# Patient Record
Sex: MALE | Race: WHITE | Employment: OTHER | URBAN - METROPOLITAN AREA
[De-identification: names, ages, dates, MRNs, and addresses within clinical notes are randomized per-mention and may not be internally consistent; named-entity substitution may affect disease eponyms.]

---

## 2019-07-10 ENCOUNTER — HOSPITAL ENCOUNTER (EMERGENCY)
Age: 74
Discharge: HOME | End: 2019-07-10
Attending: EMERGENCY MEDICINE
Payer: MEDICARE

## 2019-07-10 ENCOUNTER — APPOINTMENT (OUTPATIENT)
Dept: ULTRASOUND IMAGING | Age: 74
End: 2019-07-10
Attending: EMERGENCY MEDICINE
Payer: MEDICARE

## 2019-07-10 VITALS
RESPIRATION RATE: 16 BRPM | HEART RATE: 50 BPM | BODY MASS INDEX: 26.87 KG/M2 | HEIGHT: 69 IN | WEIGHT: 181.44 LBS | TEMPERATURE: 98.1 F | OXYGEN SATURATION: 99 % | DIASTOLIC BLOOD PRESSURE: 76 MMHG | SYSTOLIC BLOOD PRESSURE: 135 MMHG

## 2019-07-10 DIAGNOSIS — M79.89 LEG SWELLING: Primary | ICD-10-CM

## 2019-07-10 PROCEDURE — 93971 EXTREMITY STUDY: CPT

## 2019-07-10 PROCEDURE — 99282 EMERGENCY DEPT VISIT SF MDM: CPT

## 2019-07-10 RX ORDER — LANOLIN ALCOHOL/MO/W.PET/CERES
400 CREAM (GRAM) TOPICAL DAILY
COMMUNITY

## 2019-07-10 NOTE — ED TRIAGE NOTES
TRIAGE NOTE: patient arrived from home with c/o LEFT foot pain for the past 5 days. Sent here for doppler.  [facility] did blood work, XR and EKG

## 2019-07-10 NOTE — ED PROVIDER NOTES
HPI   The patient is a 26-year-old white male with a history of arthritis hiatal hernia hypertension kidney stone left inguinal hernia and prostate CA. Today he presents with leg pain and swelling which he awoke with 3 days prior to admission mostly in the ankle. He also has some ecchymosis under his foot and his ankle. He does not remember any significant trauma. [facility] not show any acute findings. They sent him here for a duplex to rule out a blood clot. He denies any chest pain or shortness of breath or any other systemic symptoms. Past Medical History:   Diagnosis Date    Arthritis     GERD (gastroesophageal reflux disease)     usually controlled with med    Hiatal hernia     Hypertension     Kidney stone     SINGLE EPISODE 2003    Left inguinal hernia 7/11/2012    Prostate CA ([address] 2003       Past Surgical History:   Procedure Laterality Date    ENDOSCOPY, COLON, DIAGNOSTIC  ?03    HX GI  07    EGD    HX HEENT  2012    catarac removal-stu.  HX OTHER SURGICAL      12/2012: Left Inguinal Hernia repair    HX RADICAL PROSTATECTOMY  03    HX UROLOGICAL  2003    LITHOTRIPSY    TOTAL HIP ARTHROPLASTY  06    right         Family History:   Problem Relation Age of Onset    Heart Disease Mother     Hypertension Father     MS Sister     MS Other        Social History     Socioeconomic History    Marital status:      Spouse name: Not on file    Number of children: Not on file    Years of education: Not on file    Highest education level: Not on file   Occupational History    Not on file   Social Needs    Financial resource strain: Not on file    Food insecurity:     Worry: Not on file     Inability: Not on file    Transportation needs:     Medical: Not on file     Non-medical: Not on file   Tobacco Use    Smoking status: Never Smoker    Smokeless tobacco: Never Used   Substance and Sexual Activity    Alcohol use:  Yes Alcohol/week: 1.0 oz     Types: 2 drink(s) per week    Drug use: No    Sexual activity: Not on file   Lifestyle    Physical activity:     Days per week: Not on file     Minutes per session: Not on file    Stress: Not on file   Relationships    Social connections:     Talks on phone: Not on file     Gets together: Not on file     Attends Scientologist service: Not on file     Active member of club or organization: Not on file     Attends meetings of clubs or organizations: Not on file     Relationship status: Not on file    Intimate partner violence:     Fear of current or ex partner: Not on file     Emotionally abused: Not on file     Physically abused: Not on file     Forced sexual activity: Not on file   Other Topics Concern    Not on file   Social History Narrative    Not on file         ALLERGIES: Patient has no known allergies. Review of Systems   All other systems reviewed and are negative. Vitals:    07/10/19 1417   BP: 147/74   Pulse: (!) 52   Resp: 18   Temp: 97.5 °F (36.4 °C)   SpO2: 97%   Weight: 82.3 kg (181 lb 7 oz)   Height: 5' 9\" (1.753 m)            Physical Exam   Constitutional: He is oriented to person, place, and time. He appears well-developed and well-nourished. HENT:   Head: Normocephalic and atraumatic. Mouth/Throat: Oropharynx is clear and moist. No oropharyngeal exudate. Eyes: Conjunctivae are normal. No scleral icterus. Neck: Neck supple. No thyromegaly present. Cardiovascular: Normal rate, regular rhythm and normal heart sounds. Exam reveals no gallop and no friction rub. No murmur heard. Pulmonary/Chest: Effort normal and breath sounds normal. No stridor. No respiratory distress. He has no wheezes. He has no rales. Abdominal: Soft. Bowel sounds are normal. There is no tenderness. There is no rebound and no guarding. Musculoskeletal: Normal range of motion.    There is mild swelling of the left ankle it is not hot or red there is some ecchymosis in the sole of his foot in the back of the ankle   Lymphadenopathy:     He has no cervical adenopathy. Neurological: He is alert and oriented to person, place, and time. Skin: Skin is warm and dry. Psychiatric: He has a normal mood and affect. Nursing note and vitals reviewed.        MDM  Number of Diagnoses or Management Options     Amount and/or Complexity of Data Reviewed  Clinical lab tests: reviewed and ordered  Tests in the radiology section of CPT®: ordered and reviewed  Tests in the medicine section of CPT®: ordered and reviewed    Risk of Complications, Morbidity, and/or Mortality  Presenting problems: low  Diagnostic procedures: low  Management options: low           Procedures    Assessment and plan     the patient had a duplex which revealed no DVT he has some mild swelling of the left lower extremity of uncertain etiology will discharge home with close follow-up with his PCP

## 2022-02-11 ENCOUNTER — OFFICE VISIT (OUTPATIENT)
Dept: NEUROLOGY | Age: 77
End: 2022-02-11
Payer: MEDICARE

## 2022-02-11 VITALS
OXYGEN SATURATION: 99 % | WEIGHT: 175 LBS | HEIGHT: 69 IN | BODY MASS INDEX: 25.92 KG/M2 | DIASTOLIC BLOOD PRESSURE: 70 MMHG | HEART RATE: 53 BPM | SYSTOLIC BLOOD PRESSURE: 130 MMHG

## 2022-02-11 DIAGNOSIS — G23.1 PSP (PROGRESSIVE SUPRANUCLEAR PALSY) (HCC): Primary | ICD-10-CM

## 2022-02-11 DIAGNOSIS — R29.6 FALLS FREQUENTLY: ICD-10-CM

## 2022-02-11 DIAGNOSIS — M48.062 LUMBAR STENOSIS WITH NEUROGENIC CLAUDICATION: ICD-10-CM

## 2022-02-11 PROCEDURE — G8432 DEP SCR NOT DOC, RNG: HCPCS | Performed by: PSYCHIATRY & NEUROLOGY

## 2022-02-11 PROCEDURE — G8754 DIAS BP LESS 90: HCPCS | Performed by: PSYCHIATRY & NEUROLOGY

## 2022-02-11 PROCEDURE — G8536 NO DOC ELDER MAL SCRN: HCPCS | Performed by: PSYCHIATRY & NEUROLOGY

## 2022-02-11 PROCEDURE — G8427 DOCREV CUR MEDS BY ELIG CLIN: HCPCS | Performed by: PSYCHIATRY & NEUROLOGY

## 2022-02-11 PROCEDURE — G8752 SYS BP LESS 140: HCPCS | Performed by: PSYCHIATRY & NEUROLOGY

## 2022-02-11 PROCEDURE — 1101F PT FALLS ASSESS-DOCD LE1/YR: CPT | Performed by: PSYCHIATRY & NEUROLOGY

## 2022-02-11 PROCEDURE — 99204 OFFICE O/P NEW MOD 45 MIN: CPT | Performed by: PSYCHIATRY & NEUROLOGY

## 2022-02-11 PROCEDURE — G8419 CALC BMI OUT NRM PARAM NOF/U: HCPCS | Performed by: PSYCHIATRY & NEUROLOGY

## 2022-02-11 RX ORDER — CARBIDOPA AND LEVODOPA 25; 100 MG/1; MG/1
TABLET ORAL
COMMUNITY
Start: 2022-02-07

## 2022-02-11 NOTE — PROGRESS NOTES
Chief Complaint   Patient presents with    Neurologic Problem     falls       Referred by: Dr. Perez Edgar is a 58-year-old gentleman with hypertension, lumbosacral degenerative disc disease here with his wife to discuss an additional opinion regarding his condition. It sounds like for the past couple years has had a lot of falls worse in the last year. He tends to fall backwards. He is also had voice changes. He is slower. He actually did see Dr. Dwight Shultz in neurology and was given a diagnosis of PSP. He is also seen various orthopedic specialist for lumbosacral spine disease. On further questioning he does admit to drooling. No nightmares. No hallucinations. An MRI was completed suggesting confirmation of PSP done at outside facility. Concomitant to this he is having a lot of radicular back pain into the right groin. He has had the right knee and right hip treated by orthopedics. He is currently in PT and speech with improvement. He is on carbidopa. Review of Systems   Musculoskeletal: Positive for back pain and falls. Psychiatric/Behavioral: Negative for hallucinations. All other systems reviewed and are negative.       Past Medical History:   Diagnosis Date    Arthritis     GERD (gastroesophageal reflux disease)     usually controlled with med    Hiatal hernia     Hypertension     Kidney stone     SINGLE EPISODE 2003    Left inguinal hernia 7/11/2012    Prostate CA ([facility] [address] 2003     Family History   Problem Relation Age of Onset    Heart Disease Mother     Hypertension Father     Mult Sclerosis Sister     Mult Sclerosis Other      Social History     Socioeconomic History    Marital status:      Spouse name: Not on file    Number of children: Not on file    Years of education: Not on file    Highest education level: Not on file   Occupational History    Not on file   Tobacco Use    Smoking status: Never Smoker    Smokeless tobacco: Never Used   Substance and Sexual Activity    Alcohol use: Yes     Alcohol/week: 1.7 standard drinks     Types: 2 drink(s) per week    Drug use: No    Sexual activity: Not on file   Other Topics Concern    Not on file   Social History Narrative    Not on file     Social Determinants of Health     Financial Resource Strain:     Difficulty of Paying Living Expenses: Not on file   Food Insecurity:     Worried About Running Out of Food in the Last Year: Not on file    Kellen of Food in the Last Year: Not on file   Transportation Needs:     Lack of Transportation (Medical): Not on file    Lack of Transportation (Non-Medical): Not on file   Physical Activity:     Days of Exercise per Week: Not on file    Minutes of Exercise per Session: Not on file   Stress:     Feeling of Stress : Not on file   Social Connections:     Frequency of Communication with Friends and Family: Not on file    Frequency of Social Gatherings with Friends and Family: Not on file    Attends Amish Services: Not on file    Active Member of [address] [place] [address] or Organizations: Not on file    Attends Club or Organization Meetings: Not on file    Marital Status: Not on file   Intimate Partner Violence:     Fear of Current or Ex-Partner: Not on file    Emotionally Abused: Not on file    Physically Abused: Not on file    Sexually Abused: Not on file   Housing Stability:     Unable to Pay for Housing in the Last Year: Not on file    Number of [place] in the Last Year: Not on file    Unstable Housing in the Last Year: Not on file     Current Outpatient Medications   Medication Sig    carbidopa-levodopa (SINEMET)  mg per tablet     omeprazole (PRILOSEC) 20 mg capsule Take 20 mg by mouth daily.  hydrochlorothiazide (HYDRODIURIL) 25 mg tablet Take 1 Tab by mouth daily.  amlodipine (NORVASC) 5 mg tablet Take 1 Tab by mouth daily.  benazepril (LOTENSIN) 40 mg tablet Take 1 Tab by mouth daily.     magnesium oxide (MAG-OX) 400 mg tablet Take 400 mg by mouth daily. (Patient not taking: Reported on 2/11/2022)    aspirin delayed-release 81 mg tablet Take  by mouth daily. (Patient not taking: Reported on 2/11/2022)     No current facility-administered medications for this visit. No Known Allergies      Neurologic Exam     Mental Status   Age-appropriate gentleman awake and alert. Pleasant. Very stare-like appearance with reduced blink rate  His face is masklike consistent with parkinsonism  Speech is soft hoarse and slow  He is rather bradykinetic throughout arms and legs  No resting tremor  Gait with some hesitation slightly wide but good tempo not shuffling once in motion     Physical Exam  Vitals and nursing note reviewed. Constitutional:       Appearance: Normal appearance. Neurological:      Mental Status: He is alert. Visit Vitals  /70   Pulse (!) 53   Ht 5' 9\" (1.753 m)   Wt 175 lb (79.4 kg)   SpO2 99%   BMI 25.84 kg/m²       Lab Results   Component Value Date/Time    WBC 5.7 11/27/2012 10:01 AM    HGB 13.5 11/27/2012 10:01 AM    HCT 38.9 11/27/2012 10:01 AM    PLATELET 882 91/60/3918 10:01 AM    MCV 86.6 11/27/2012 10:01 AM     Lab Results   Component Value Date/Time    Glucose 93 03/15/2013 09:00 AM    LDL, calculated 81 11/10/2010 08:58 AM    Creatinine 1.03 03/15/2013 09:00 AM      Lab Results   Component Value Date/Time    Cholesterol, total 143 11/10/2010 08:58 AM    HDL Cholesterol 42 11/10/2010 08:58 AM    LDL, calculated 81 11/10/2010 08:58 AM    Triglyceride 100 11/10/2010 08:58 AM    CHOL/HDL Ratio 71.5 (H) 11/10/2010 08:58 AM     Lab Results   Component Value Date/Time    PLATELET 949 75/58/8118 10:01 AM        CT Results (maximum last 3): No results found for this or any previous visit. MRI Results (maximum last 3): No results found for this or any previous visit. PET Results (maximum last 3): No results found for this or any previous visit. Assessment and Plan   Diagnoses and all orders for this visit:    1. PSP (progressive supranuclear palsy) ([facility] [address]    2. Falls frequently    3. 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 He has 2 conditions that severely affect his ability to ambulate safely. I agree with the diagnosis of PSP clinically. He is already with the appropriate specialist who is a movement disorder neurologist, Dr. Colin Joshi. He should continue care with Dr. Colin Joshi. No change to carbidopa. Continue with PT and speech ongoing which is helpful. I spent a large period of time discussing the nature of this condition and that the ultimate ideal treatment is therapy in the end. The lumbosacral disease is longstanding and probably causing some radicular symptoms down the right leg/right groin. He should continue to see Ortho for that issue. I do not have any additional tests at this time. He is already on the appropriate track of care. 45 minutes of time was taken in total today reviewing the medical record to include review of the external paper records, face-to-face time with the patient and his wife, and time completing documentation today. I reviewed and decided to continue the current medications. This clinical note was dictated with an electronic dictation software that can make unintentional errors. If there are any questions, please contact me directly for clarification. A notice of this visit/encounter being completed has been sent electronically to the patient's PCP and/or referring provider.      [address] [facility], [facility] [address]  Diplomate SAMANTHA

## 2022-02-11 NOTE — LETTER
2/11/2022    Patient: Ford Eid   YOB: 1945   Date of Visit: 2/11/2022     Jocelynn Perez, [address] [facility]  [address] [place] [id number]  Via Fax: [phone]    Dear Jocelynn Perez MD,      Thank you for referring Mr. Nando Antony to [address] [place] [address] for evaluation. My notes for this consultation are attached. If you have questions, please do not hesitate to call me. I look forward to following your patient along with you. Sincerely,    [address] [facility], DO    2/11/2022     Patient:  Ford Eid   YOB: 1945  Date of Visit: 2/11/2022      Dear Jocelynn Perez MD  [address] [facility]  [address] [place] [id number]  Via Fax: [phone]: I was requested by Leeanne Arriola MD to evaluate Mr. Nando Antony  for   Chief Complaint   Patient presents with    Neurologic Problem     falls   . I am recommending the following:     Diagnoses and all orders for this visit:    1. PSP (progressive supranuclear palsy) ([facility] [address]    2. Falls frequently    3. Lumbar stenosis with neurogenic claudication        ----------------------------------------------------------------------------------------------------------------------  Below is my encounter:       Chief Complaint   Patient presents with    Neurologic Problem     falls       Referred by: Dr. Alina Hernandez is a 25-year-old gentleman with hypertension, lumbosacral degenerative disc disease here with his wife to discuss an additional opinion regarding his condition. It sounds like for the past couple years has had a lot of falls worse in the last year. He tends to fall backwards. He is also had voice changes. He is slower. He actually did see Dr. Tyrone Chang in neurology and was given a diagnosis of PSP. He is also seen various orthopedic specialist for lumbosacral spine disease. On further questioning he does admit to drooling. No nightmares. No hallucinations. An MRI was completed suggesting confirmation of PSP done at outside facility. Concomitant to this he is having a lot of radicular back pain into the right groin. He has had the right knee and right hip treated by orthopedics. He is currently in PT and speech with improvement. He is on carbidopa. Review of Systems   Musculoskeletal: Positive for back pain and falls. Psychiatric/Behavioral: Negative for hallucinations. All other systems reviewed and are negative. Past Medical History:   Diagnosis Date    Arthritis     GERD (gastroesophageal reflux disease)     usually controlled with med    Hiatal hernia     Hypertension     Kidney stone     SINGLE EPISODE 2003    Left inguinal hernia 7/11/2012    Prostate CA ([facility] [address] 2003     Family History   Problem Relation Age of Onset    Heart Disease Mother     Hypertension Father     Mult Sclerosis Sister     Mult Sclerosis Other      Social History     Socioeconomic History    Marital status:      Spouse name: Not on file    Number of children: Not on file    Years of education: Not on file    Highest education level: Not on file   Occupational History    Not on file   Tobacco Use    Smoking status: Never Smoker    Smokeless tobacco: Never Used   Substance and Sexual Activity    Alcohol use: Yes     Alcohol/week: 1.7 standard drinks     Types: 2 drink(s) per week    Drug use: No    Sexual activity: Not on file   Other Topics Concern    Not on file   Social History Narrative    Not on file     Social Determinants of Health     Financial Resource Strain:     Difficulty of Paying Living Expenses: Not on file   Food Insecurity:     Worried About Running Out of Food in the Last Year: Not on file    Kellen of Food in the Last Year: Not on file   Transportation Needs:     Lack of Transportation (Medical): Not on file    Lack of Transportation (Non-Medical):  Not on file   Physical Activity:     Days of Exercise per Week: Not on file    Minutes of Exercise per Session: Not on file   Stress:     Feeling of Stress : Not on file   Social Connections:     Frequency of Communication with Friends and Family: Not on file    Frequency of Social Gatherings with Friends and Family: Not on file    Attends Pentecostalism Services: Not on file    Active Member of [address] [place] [address] or Organizations: Not on file    Attends Club or Organization Meetings: Not on file    Marital Status: Not on file   Intimate Partner Violence:     Fear of Current or Ex-Partner: Not on file    Emotionally Abused: Not on file    Physically Abused: Not on file    Sexually Abused: Not on file   Housing Stability:     Unable to Pay for Housing in the Last Year: Not on file    Number of [place] in the Last Year: Not on file    Unstable Housing in the Last Year: Not on file     Current Outpatient Medications   Medication Sig    carbidopa-levodopa (SINEMET)  mg per tablet     omeprazole (PRILOSEC) 20 mg capsule Take 20 mg by mouth daily.  hydrochlorothiazide (HYDRODIURIL) 25 mg tablet Take 1 Tab by mouth daily.  amlodipine (NORVASC) 5 mg tablet Take 1 Tab by mouth daily.  benazepril (LOTENSIN) 40 mg tablet Take 1 Tab by mouth daily.  magnesium oxide (MAG-OX) 400 mg tablet Take 400 mg by mouth daily. (Patient not taking: Reported on 2/11/2022)    aspirin delayed-release 81 mg tablet Take  by mouth daily. (Patient not taking: Reported on 2/11/2022)     No current facility-administered medications for this visit. No Known Allergies      Neurologic Exam     Mental Status   Age-appropriate gentleman awake and alert. Pleasant.   Very stare-like appearance with reduced blink rate  His face is masklike consistent with parkinsonism  Speech is soft hoarse and slow  He is rather bradykinetic throughout arms and legs  No resting tremor  Gait with some hesitation slightly wide but good tempo not shuffling once in motion     Physical Exam  Vitals and nursing note reviewed. Constitutional:       Appearance: Normal appearance. Neurological:      Mental Status: He is alert. Visit Vitals  /70   Pulse (!) 53   Ht 5' 9\" (1.753 m)   Wt 175 lb (79.4 kg)   SpO2 99%   BMI 25.84 kg/m²       Lab Results   Component Value Date/Time    WBC 5.7 11/27/2012 10:01 AM    HGB 13.5 11/27/2012 10:01 AM    HCT 38.9 11/27/2012 10:01 AM    PLATELET 805 39/60/7389 10:01 AM    MCV 86.6 11/27/2012 10:01 AM     Lab Results   Component Value Date/Time    Glucose 93 03/15/2013 09:00 AM    LDL, calculated 81 11/10/2010 08:58 AM    Creatinine 1.03 03/15/2013 09:00 AM      Lab Results   Component Value Date/Time    Cholesterol, total 143 11/10/2010 08:58 AM    HDL Cholesterol 42 11/10/2010 08:58 AM    LDL, calculated 81 11/10/2010 08:58 AM    Triglyceride 100 11/10/2010 08:58 AM    CHOL/HDL Ratio 71.5 (H) 11/10/2010 08:58 AM     Lab Results   Component Value Date/Time    PLATELET 847 91/82/3859 10:01 AM        CT Results (maximum last 3): No results found for this or any previous visit. MRI Results (maximum last 3): No results found for this or any previous visit. PET Results (maximum last 3): No results found for this or any previous visit. Assessment and Plan   Diagnoses and all orders for this visit:    1. PSP (progressive supranuclear palsy) ([facility] [address]    2. Falls frequently    3. 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 He has 2 conditions that severely affect his ability to ambulate safely. I agree with the diagnosis of PSP clinically. He is already with the appropriate specialist who is a movement disorder neurologist, Dr. Anton Jones. He should continue care with Dr. Anton Jones. No change to carbidopa. Continue with PT and speech ongoing which is helpful.  I spent a large period of time discussing the nature of this condition and that the ultimate ideal treatment is therapy in the end. The lumbosacral disease is longstanding and probably causing some radicular symptoms down the right leg/right groin. He should continue to see Ortho for that issue. I do not have any additional tests at this time. He is already on the appropriate track of care. 45 minutes of time was taken in total today reviewing the medical record to include review of the external paper records, face-to-face time with the patient and his wife, and time completing documentation today. I reviewed and decided to continue the current medications. This clinical note was dictated with an electronic dictation software that can make unintentional errors. If there are any questions, please contact me directly for clarification. Thank you for giving me the opportunity to assist in the care of Mr. Radha Boyer. If you have questions, please do not hesitate to contact me.         Sincerely,      Gael Copeland, DO  Neurologist  [facility]  Diplomate [place]

## 2023-09-19 ENCOUNTER — HOSPITAL ENCOUNTER (OUTPATIENT)
Age: 78
Discharge: HOME | End: 2023-09-22
Payer: COMMERCIAL

## 2023-09-19 DIAGNOSIS — Z91.81 PERSONAL HISTORY OF FALL: ICD-10-CM

## 2023-09-19 DIAGNOSIS — M79.641 RIGHT HAND PAIN: ICD-10-CM

## 2023-09-19 PROCEDURE — 73140 X-RAY EXAM OF FINGER(S): CPT

## 2023-11-28 ENCOUNTER — HOSPITAL ENCOUNTER (OUTPATIENT)
Age: 78
Discharge: HOME | End: 2023-12-01
Payer: COMMERCIAL

## 2023-11-28 DIAGNOSIS — J40 BRONCHITIS, NOT SPECIFIED AS ACUTE OR CHRONIC: ICD-10-CM

## 2023-11-28 PROCEDURE — 71046 X-RAY EXAM CHEST 2 VIEWS: CPT
